# Patient Record
Sex: MALE | Race: WHITE | NOT HISPANIC OR LATINO | Employment: FULL TIME | URBAN - METROPOLITAN AREA
[De-identification: names, ages, dates, MRNs, and addresses within clinical notes are randomized per-mention and may not be internally consistent; named-entity substitution may affect disease eponyms.]

---

## 2019-05-21 ENCOUNTER — HISTORICAL (OUTPATIENT)
Dept: ADMINISTRATIVE | Facility: HOSPITAL | Age: 59
End: 2019-05-21

## 2019-12-13 ENCOUNTER — HISTORICAL (OUTPATIENT)
Dept: LAB | Facility: HOSPITAL | Age: 59
End: 2019-12-13

## 2019-12-23 ENCOUNTER — HISTORICAL (OUTPATIENT)
Dept: RADIOLOGY | Facility: HOSPITAL | Age: 59
End: 2019-12-23

## 2020-12-17 ENCOUNTER — HISTORICAL (OUTPATIENT)
Dept: ADMINISTRATIVE | Facility: HOSPITAL | Age: 60
End: 2020-12-17

## 2020-12-20 LAB — FINAL CULTURE: NO GROWTH

## 2021-09-02 ENCOUNTER — HISTORICAL (OUTPATIENT)
Dept: LAB | Facility: HOSPITAL | Age: 61
End: 2021-09-02

## 2021-09-02 LAB
ALBUMIN SERPL-MCNC: 4 GM/DL (ref 3.4–4.8)
ALP SERPL-CCNC: 86 UNIT/L (ref 40–150)
ALT SERPL-CCNC: 18 UNIT/L (ref 0–55)
AST SERPL-CCNC: 19 UNIT/L (ref 5–34)
BILIRUB SERPL-MCNC: 1.9 MG/DL
BILIRUBIN DIRECT+TOT PNL SERPL-MCNC: 0.6 MG/DL (ref 0–0.5)
BILIRUBIN DIRECT+TOT PNL SERPL-MCNC: 1.3 MG/DL (ref 0–0.8)
BUN SERPL-MCNC: 20.2 MG/DL (ref 8.4–25.7)
CALCIUM SERPL-MCNC: 9.1 MG/DL (ref 8.8–10)
CHLORIDE SERPL-SCNC: 102 MMOL/L (ref 98–107)
CHOLEST SERPL-MCNC: 207 MG/DL
CHOLEST/HDLC SERPL: 4 {RATIO} (ref 0–5)
CO2 SERPL-SCNC: 30 MMOL/L (ref 23–31)
CREAT SERPL-MCNC: 1.26 MG/DL (ref 0.73–1.18)
CREAT/UREA NIT SERPL: 16
ERYTHROCYTE [DISTWIDTH] IN BLOOD BY AUTOMATED COUNT: 11.6 % (ref 11.5–17)
FT4I SERPL CALC-MCNC: 2.48 (ref 2.6–3.6)
GLUCOSE SERPL-MCNC: 99 MG/DL (ref 82–115)
HAV IGM SERPL QL IA: NONREACTIVE
HBV CORE IGM SERPL QL IA: NONREACTIVE
HBV SURFACE AG SERPL QL IA: NONREACTIVE
HCT VFR BLD AUTO: 44.8 % (ref 42–52)
HCV AB SERPL QL IA: NONREACTIVE
HDLC SERPL-MCNC: 57 MG/DL (ref 35–60)
HEPATITIS PANEL INTERP: NORMAL
HGB BLD-MCNC: 15.7 GM/DL (ref 14–18)
LDLC SERPL CALC-MCNC: 125 MG/DL (ref 50–140)
MCH RBC QN AUTO: 32.8 PG (ref 27–31)
MCHC RBC AUTO-ENTMCNC: 35 GM/DL (ref 33–36)
MCV RBC AUTO: 93.7 FL (ref 80–94)
PLATELET # BLD AUTO: 182 X10(3)/MCL (ref 130–400)
PMV BLD AUTO: 8.5 FL (ref 9.4–12.4)
POTASSIUM SERPL-SCNC: 4.4 MMOL/L (ref 3.5–5.1)
PROT SERPL-MCNC: 6.4 GM/DL (ref 5.8–7.6)
PSA SERPL-MCNC: 0.77 NG/ML
RBC # BLD AUTO: 4.78 X10(6)/MCL (ref 4.7–6.1)
SODIUM SERPL-SCNC: 138 MMOL/L (ref 136–145)
T3RU NFR SERPL: 33.7 % (ref 31–39)
T4 SERPL-MCNC: 7.35 UG/DL (ref 4.87–11.72)
TRIGL SERPL-MCNC: 124 MG/DL (ref 34–140)
TSH SERPL-ACNC: 2.37 UIU/ML (ref 0.35–4.94)
VLDLC SERPL CALC-MCNC: 25 MG/DL
WBC # SPEC AUTO: 4.6 X10(3)/MCL (ref 4.5–11.5)

## 2022-04-10 ENCOUNTER — HISTORICAL (OUTPATIENT)
Dept: ADMINISTRATIVE | Facility: HOSPITAL | Age: 62
End: 2022-04-10

## 2022-04-29 VITALS
SYSTOLIC BLOOD PRESSURE: 128 MMHG | HEIGHT: 69 IN | WEIGHT: 168 LBS | WEIGHT: 168 LBS | BODY MASS INDEX: 24.88 KG/M2 | DIASTOLIC BLOOD PRESSURE: 84 MMHG | HEIGHT: 69 IN | BODY MASS INDEX: 24.88 KG/M2

## 2022-05-02 NOTE — HISTORICAL [facility]
This is a historical note converted from Mae. Formatting and pictures may have been removed.  Please reference Mae for original formatting and attached multimedia. Chief Complaint  c/o left knee pain and swelling, denies any injuries, previous sx 1975 and 1982 cartilage damage, xrays today...sm  History of Present Illness  Knee symptoms ::knee gives way ?? ?Knee joint pain on the left ?? ?Worse with weightbearing ?? ?Worse in the morning  ?? ?Slowly worsens with extended activity ?? ?Is increased by bending it ?? ?By twisting it ?? ?By kneeling ?? ?With stairs ?? ?By squatting  ?? ?Knee joint swelling on the left? ?posteriorly ?? ?Medially ?? ?Laterally? ?  ? ?Knee joint pain not improved by rest ?? ?Not by ice? ?? ?No clicking sensation in the left knee ?? ?No grating sensation in the left knee?  ?? ?The knee did not suddenly buckle due to contact ?? ?No popping sound was heard in the knee?  ?? ?No tingling ?? ?No burning sensation  Review of Systems  Review of Systems?  ?????Constitutional: ?No fever, No chills. ?  ?????Respiratory: ?No shortness of breath, No cough. ?  ?????Cardiovascular: ?No chest pain. ?  ?????Gastrointestinal: ?No nausea, No vomiting, No diarrhea, No constipation, No heartburn. ?  ?????Genitourinary: ?No dysuria, No hematuria. ?  ?????Hematology/Lymphatics: ?No bleeding tendency. ?  ?????Endocrine: ?No polyuria. ?  ?????Neurologic: ?Alert and oriented X4, No numbness, No tingling. ?  ?????Psychiatric: ?No anxiety, No depression. ?  ?????Integumentary: no abnormalities except as noted in history of present illness  Physical Exam  Vitals & Measurements  BP:?128/84?  HT:?175.26?cm? WT:?76.20?kg? BMI:?24.81?  PHYSICAL FINDINGS  Cardiovascular:  ? ?? Arterial Pulses: ? Dorsalis pedis pulses were normal right.? ? Dorsalis pedis pulses were normal left.  Musculoskeletal System:  ? ?? Hips:  ? ?? ?? ? General/bilateral: ? No swelling of the hips.? ? No induration of the hips.  ? ?? ?? ? Right  Hip: ? Motion was normal.? ? No pain was elicited by active internal rotation with the hip flexed.  ? ?? ?? ? ? No pain was elicited by active external rotation with the hip flexed.? ? No pain was elicited by active motion.? ? No pain was elicited by passive motion.  ? ?? ?? ? Left Hip: ? Motion was normal.  ? ?? Left Knee: ? Examined.? ? Positive effusion.? ?Localized swelling.? ?Genu varum.? ?Patella demonstrated crepitus.? ?Anterolateral aspect was tender on palpation.???Lateral?aspect was tender on palpation.  patella  Left Knee:  Knee Motion:? ?? ?? Value???????????  ? Active flexion??????120??? ?degrees  Active extension????10?? ?degrees  ?   left knee ?? Pain was elicited throughout the range of motion.? ? Swelling with a ?positive fluctuation test.? ? No erythema.? ? No warmth.? ? Anterior aspect was not tender on palpation.? ? Patellofemoral region was tender on palpation.? ? Medial collateral ligament was not tender on palpation.? ? Lateral collateral ligament was not tender on palpation.? ? No pain was elicited by motion using Houlton apprehension test.? ? No laxity of the posterior cruciate ligament.? ? No anterior drawer sign was present.? ? No one plane medial (straight) instability.? ? No one plane lateral (straight) instability.? ? A Lachman test did not demonstrate one plane anterior instability.? ? Clarkes sign was observed for chondromalacia.  ?   Right Knee:  Knee Motion Normal Range  ?   TESTS  Imaging:  X-Ray Knee:  AP and lateral view x-rays of the left knee with sunrise view of the patella were performed -of left knee.  ?   IMPRESSIONS RADIOLOGY TEST  Narrowing of the left knee joint space bone on bone laterally with staples , showed sclerosis of the left knee, and osteophytes arising from the left knee.  ?   aspirate and inject left knee  Administered corticosteroid injection?? ? 2cc cortisone and 2cc lidocaine using sterile technique after informed verbal consent. Risks discussed with  patient prior to injection. Informed the patient of the following:  -?Explained to patient that this injection in some patients will experience increased pain a few minutes after the injection and that the pain will last anywhere from 10 to 20 minutes. In order to decrease the pain you need to do the following:  ?Make sure to move the extremity in which the injection was given. If you do not move the medication sits there and can cause more pain.  Ice the affected joint every 2 hours for 20 minutes at a time for the next 24 hours.  ?If you are not already taking an anti-inflammatory take the following Ibuprofen/ Advil take 3 to 4 tablets every 6 to 8 hours or 2 Aleve every 12 hours for the next 5 days to help the medication work. If you cannot take anti-inflammatory take 2 extra strength Tylenol every 6 hours for the next 5 days.  Patient voiced understanding ?????????????????????????????????????????????????????????????????????????????  Assessment/Plan  1.?Primary osteoarthritis of left knee?M17.12  Referrals  Clinic Follow up, *Est. 06/21/19 3:00:00 CDT, Order for future visit, Primary osteoarthritis of left knee, [facility]   Problem List/Past Medical History  Ongoing  Primary osteoarthritis of left knee  Rupture of right proximal biceps tendon  Historical  No qualifying data  Procedure/Surgical History  LEFT KNEE SURGERY RECONSTRUCTION  LEFT SHOULDER ARTHROSCOPY  RIGHT KNEE ARTHROSCOPY   Medications  meloxicam 7.5 mg oral tablet, 7.5 mg= 1 tab(s), Oral, Daily  OXYCOD/APAP TAB 10-325MG, 1 tab(s), Oral, q4hr,? ?Not taking: Last Dose Date/Time Unknown. DONE  traMADol 50 mg oral tablet, 50 mg= 1 tab(s), Oral, BID  Allergies  No Known Medication Allergies  Social History  Alcohol  Never, 06/08/2017  Employment/School  Employed, 06/08/2017  Home/Environment  Lives with Spouse. Living situation: Home/Independent., 06/08/2017  Substance Abuse  Never, 06/08/2017  Tobacco  Never (less than 100 in lifetime), N/A,  05/21/2019  Health Maintenance  Health Maintenance  ???Pending?(in the next year)  ??? ??OverDue  ??? ? ? ?Alcohol Misuse Screening due??01/01/19??and every 1??year(s)  ??? ??Due?  ??? ? ? ?ADL Screening due??06/06/19??and every 1??year(s)  ??? ? ? ?Aspirin Therapy for CVD Prevention due??06/06/19??and every 1??year(s)  ??? ? ? ?Colorectal Screening due??06/06/19??and every?  ??? ? ? ?Depression Screening due??06/06/19??and every?  ??? ? ? ?Diabetes Screening due??06/06/19??and every?  ??? ? ? ?Lipid Screening due??06/06/19??and every?  ??? ? ? ?Tetanus Vaccine due??06/06/19??and every 10??year(s)  ??? ? ? ?Zoster Vaccine due??06/06/19??and every 100??year(s)  ??? ??Due In Future?  ??? ? ? ?Obesity Screening not due until??01/01/20??and every 1??year(s)  ??? ? ? ?Blood Pressure Screening not due until??05/20/20??and every 1??year(s)  ??? ? ? ?Body Mass Index Check not due until??05/20/20??and every 1??year(s)  ???Satisfied?(in the past 1 year)  ??? ??Satisfied?  ??? ? ? ?Blood Pressure Screening on??05/21/19.??Satisfied by Deonna Scott LPN  ??? ? ? ?Body Mass Index Check on??05/21/19.??Satisfied by Deonna Scott LPN  ??? ? ? ?Obesity Screening on??05/21/19.??Satisfied by Deonna Scott LPN  ?  ?

## 2022-10-21 ENCOUNTER — LAB VISIT (OUTPATIENT)
Dept: LAB | Facility: HOSPITAL | Age: 62
End: 2022-10-21
Attending: FAMILY MEDICINE
Payer: COMMERCIAL

## 2022-10-21 DIAGNOSIS — Z01.818 PREOP TESTING: Primary | ICD-10-CM

## 2022-10-21 LAB
ALBUMIN SERPL-MCNC: 3.8 GM/DL (ref 3.4–4.8)
ALBUMIN/GLOB SERPL: 1.4 RATIO (ref 1.1–2)
ALP SERPL-CCNC: 74 UNIT/L (ref 40–150)
ALT SERPL-CCNC: 21 UNIT/L (ref 0–55)
APTT PPP: 26.4 SECONDS (ref 23.2–33.7)
AST SERPL-CCNC: 23 UNIT/L (ref 5–34)
BASOPHILS # BLD AUTO: 0.02 X10(3)/MCL (ref 0–0.2)
BASOPHILS NFR BLD AUTO: 0.4 %
BILIRUBIN DIRECT+TOT PNL SERPL-MCNC: 1 MG/DL
BUN SERPL-MCNC: 25.5 MG/DL (ref 8.4–25.7)
CALCIUM SERPL-MCNC: 9.4 MG/DL (ref 8.8–10)
CHLORIDE SERPL-SCNC: 103 MMOL/L (ref 98–107)
CO2 SERPL-SCNC: 31 MMOL/L (ref 23–31)
CREAT SERPL-MCNC: 1.15 MG/DL (ref 0.73–1.18)
EOSINOPHIL # BLD AUTO: 0.11 X10(3)/MCL (ref 0–0.9)
EOSINOPHIL NFR BLD AUTO: 2 %
ERYTHROCYTE [DISTWIDTH] IN BLOOD BY AUTOMATED COUNT: 11.8 % (ref 11.5–17)
GFR SERPLBLD CREATININE-BSD FMLA CKD-EPI: >60 MLS/MIN/1.73/M2
GLOBULIN SER-MCNC: 2.8 GM/DL (ref 2.4–3.5)
GLUCOSE SERPL-MCNC: 103 MG/DL (ref 82–115)
HCT VFR BLD AUTO: 43.2 % (ref 42–52)
HGB BLD-MCNC: 14.3 GM/DL (ref 14–18)
IMM GRANULOCYTES # BLD AUTO: 0 X10(3)/MCL (ref 0–0.04)
IMM GRANULOCYTES NFR BLD AUTO: 0 %
INR BLD: 1.05 (ref 0–1.3)
LYMPHOCYTES # BLD AUTO: 1.45 X10(3)/MCL (ref 0.6–4.6)
LYMPHOCYTES NFR BLD AUTO: 26.3 %
MCH RBC QN AUTO: 31.4 PG (ref 27–31)
MCHC RBC AUTO-ENTMCNC: 33.1 MG/DL (ref 33–36)
MCV RBC AUTO: 94.9 FL (ref 80–94)
MONOCYTES # BLD AUTO: 0.38 X10(3)/MCL (ref 0.1–1.3)
MONOCYTES NFR BLD AUTO: 6.9 %
NEUTROPHILS # BLD AUTO: 3.6 X10(3)/MCL (ref 2.1–9.2)
NEUTROPHILS NFR BLD AUTO: 64.4 %
PLATELET # BLD AUTO: 216 X10(3)/MCL (ref 130–400)
PMV BLD AUTO: 8.5 FL (ref 7.4–10.4)
POTASSIUM SERPL-SCNC: 4.3 MMOL/L (ref 3.5–5.1)
PROT SERPL-MCNC: 6.6 GM/DL (ref 5.8–7.6)
PROTHROMBIN TIME: 10.6 SECONDS (ref 12.5–14.5)
RBC # BLD AUTO: 4.55 X10(6)/MCL (ref 4.7–6.1)
SODIUM SERPL-SCNC: 140 MMOL/L (ref 136–145)
WBC # SPEC AUTO: 5.5 X10(3)/MCL (ref 4.5–11.5)

## 2022-10-21 PROCEDURE — 85730 THROMBOPLASTIN TIME PARTIAL: CPT

## 2022-10-21 PROCEDURE — 85610 PROTHROMBIN TIME: CPT

## 2022-10-21 PROCEDURE — 85025 COMPLETE CBC W/AUTO DIFF WBC: CPT

## 2022-10-21 PROCEDURE — 80053 COMPREHEN METABOLIC PANEL: CPT

## 2022-10-21 PROCEDURE — 36415 COLL VENOUS BLD VENIPUNCTURE: CPT

## 2023-11-30 DIAGNOSIS — M25.562 LEFT KNEE PAIN, UNSPECIFIED CHRONICITY: Primary | ICD-10-CM

## 2023-12-05 ENCOUNTER — HOSPITAL ENCOUNTER (OUTPATIENT)
Dept: RADIOLOGY | Facility: CLINIC | Age: 63
Discharge: HOME | End: 2023-12-05
Attending: ORTHOPAEDIC SURGERY
Payer: MEDICARE

## 2023-12-05 ENCOUNTER — OFFICE VISIT (OUTPATIENT)
Dept: ORTHOPEDICS | Facility: CLINIC | Age: 63
End: 2023-12-05
Payer: MEDICARE

## 2023-12-05 VITALS
WEIGHT: 176 LBS | HEIGHT: 69 IN | BODY MASS INDEX: 26.07 KG/M2 | HEART RATE: 61 BPM | SYSTOLIC BLOOD PRESSURE: 141 MMHG | DIASTOLIC BLOOD PRESSURE: 76 MMHG

## 2023-12-05 DIAGNOSIS — M17.12 PRIMARY OSTEOARTHRITIS OF LEFT KNEE: ICD-10-CM

## 2023-12-05 DIAGNOSIS — M25.562 CHRONIC PAIN OF LEFT KNEE: ICD-10-CM

## 2023-12-05 DIAGNOSIS — G89.29 CHRONIC PAIN OF LEFT KNEE: Primary | ICD-10-CM

## 2023-12-05 DIAGNOSIS — G89.29 CHRONIC PAIN OF LEFT KNEE: ICD-10-CM

## 2023-12-05 DIAGNOSIS — M25.562 CHRONIC PAIN OF LEFT KNEE: Primary | ICD-10-CM

## 2023-12-05 PROCEDURE — 99204 OFFICE O/P NEW MOD 45 MIN: CPT | Mod: 25,,, | Performed by: ORTHOPAEDIC SURGERY

## 2023-12-05 PROCEDURE — 1101F PT FALLS ASSESS-DOCD LE1/YR: CPT | Mod: CPTII,,, | Performed by: ORTHOPAEDIC SURGERY

## 2023-12-05 PROCEDURE — 4010F PR ACE/ARB THEARPY RXD/TAKEN: ICD-10-PCS | Mod: CPTII,,, | Performed by: ORTHOPAEDIC SURGERY

## 2023-12-05 PROCEDURE — 3008F BODY MASS INDEX DOCD: CPT | Mod: CPTII,,, | Performed by: ORTHOPAEDIC SURGERY

## 2023-12-05 PROCEDURE — 3008F PR BODY MASS INDEX (BMI) DOCUMENTED: ICD-10-PCS | Mod: CPTII,,, | Performed by: ORTHOPAEDIC SURGERY

## 2023-12-05 PROCEDURE — 3077F SYST BP >= 140 MM HG: CPT | Mod: CPTII,,, | Performed by: ORTHOPAEDIC SURGERY

## 2023-12-05 PROCEDURE — 1101F PR PT FALLS ASSESS DOC 0-1 FALLS W/OUT INJ PAST YR: ICD-10-PCS | Mod: CPTII,,, | Performed by: ORTHOPAEDIC SURGERY

## 2023-12-05 PROCEDURE — 20610 DRAIN/INJ JOINT/BURSA W/O US: CPT | Mod: LT,,, | Performed by: ORTHOPAEDIC SURGERY

## 2023-12-05 PROCEDURE — 4010F ACE/ARB THERAPY RXD/TAKEN: CPT | Mod: CPTII,,, | Performed by: ORTHOPAEDIC SURGERY

## 2023-12-05 PROCEDURE — 3077F PR MOST RECENT SYSTOLIC BLOOD PRESSURE >= 140 MM HG: ICD-10-PCS | Mod: CPTII,,, | Performed by: ORTHOPAEDIC SURGERY

## 2023-12-05 PROCEDURE — 3288F FALL RISK ASSESSMENT DOCD: CPT | Mod: CPTII,,, | Performed by: ORTHOPAEDIC SURGERY

## 2023-12-05 PROCEDURE — 3078F PR MOST RECENT DIASTOLIC BLOOD PRESSURE < 80 MM HG: ICD-10-PCS | Mod: CPTII,,, | Performed by: ORTHOPAEDIC SURGERY

## 2023-12-05 PROCEDURE — 3078F DIAST BP <80 MM HG: CPT | Mod: CPTII,,, | Performed by: ORTHOPAEDIC SURGERY

## 2023-12-05 PROCEDURE — 73564 XR KNEE COMP 4 OR MORE VIEWS LEFT: ICD-10-PCS | Mod: LT,,, | Performed by: ORTHOPAEDIC SURGERY

## 2023-12-05 PROCEDURE — 99204 PR OFFICE/OUTPT VISIT, NEW, LEVL IV, 45-59 MIN: ICD-10-PCS | Mod: 25,,, | Performed by: ORTHOPAEDIC SURGERY

## 2023-12-05 PROCEDURE — 73564 X-RAY EXAM KNEE 4 OR MORE: CPT | Mod: LT,,, | Performed by: ORTHOPAEDIC SURGERY

## 2023-12-05 PROCEDURE — 1159F PR MEDICATION LIST DOCUMENTED IN MEDICAL RECORD: ICD-10-PCS | Mod: CPTII,,, | Performed by: ORTHOPAEDIC SURGERY

## 2023-12-05 PROCEDURE — 20610 LARGE JOINT ASPIRATION/INJECTION: L KNEE: ICD-10-PCS | Mod: LT,,, | Performed by: ORTHOPAEDIC SURGERY

## 2023-12-05 PROCEDURE — 1159F MED LIST DOCD IN RCRD: CPT | Mod: CPTII,,, | Performed by: ORTHOPAEDIC SURGERY

## 2023-12-05 PROCEDURE — 3288F PR FALLS RISK ASSESSMENT DOCUMENTED: ICD-10-PCS | Mod: CPTII,,, | Performed by: ORTHOPAEDIC SURGERY

## 2023-12-05 RX ORDER — METHYLPREDNISOLONE ACETATE 80 MG/ML
80 INJECTION, SUSPENSION INTRA-ARTICULAR; INTRALESIONAL; INTRAMUSCULAR; SOFT TISSUE
Status: DISCONTINUED | OUTPATIENT
Start: 2023-12-05 | End: 2023-12-05 | Stop reason: HOSPADM

## 2023-12-05 RX ORDER — LIDOCAINE HYDROCHLORIDE 20 MG/ML
5 INJECTION, SOLUTION EPIDURAL; INFILTRATION; INTRACAUDAL; PERINEURAL
Status: DISCONTINUED | OUTPATIENT
Start: 2023-12-05 | End: 2023-12-05 | Stop reason: HOSPADM

## 2023-12-05 RX ORDER — EZETIMIBE 10 MG/1
10 TABLET ORAL
COMMUNITY
Start: 2023-09-21

## 2023-12-05 RX ORDER — OXYCODONE AND ACETAMINOPHEN 10; 325 MG/1; MG/1
TABLET ORAL
COMMUNITY
Start: 2023-12-04

## 2023-12-05 RX ADMIN — LIDOCAINE HYDROCHLORIDE 5 ML: 20 INJECTION, SOLUTION EPIDURAL; INFILTRATION; INTRACAUDAL; PERINEURAL at 08:12

## 2023-12-05 RX ADMIN — METHYLPREDNISOLONE ACETATE 80 MG: 80 INJECTION, SUSPENSION INTRA-ARTICULAR; INTRALESIONAL; INTRAMUSCULAR; SOFT TISSUE at 08:12

## 2023-12-05 NOTE — PROGRESS NOTES
Chief Complaint:   Chief Complaint   Patient presents with    Left Knee - Pain     Pt has hx of sx over 10 years ago. Pt states that the pain has gotten worse throughout these years. Pt states he has no cartilage left. Pt had several aspirations in the past, and doesn't recall any injections.        History of present illness:  63-year-old male presents today for evaluation left knee pain.  Patient has a longstanding of pain to left knee.  He is tried anti-inflammatories.  He is tried activity modification.  Patient has a history of surgeon left knee years ago for a cartilage issue he was high school/college.  Patient has popping catching in the left knee.  Worse with activity.  Improved by rest.    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 OR;  Service: ENT;  Laterality: Right;    ARTHROSCOPY OF KNEE Bilateral     COLONOSCOPY      SHOULDER SURGERY Left     TRIGGER FINGER RELEASE Bilateral     x2 right hand, x2 left hand    TYMPANOPLASTY Right 12/22/2022    Procedure: RIGHT POST AURICULAR CARTILAGE TYMPANOPLASTY- NDM;  Surgeon: Jesu Bautista Jr., MD;  Location: [facility] OR;  Service: ENT;  Laterality: Right;       Current Outpatient Medications   Medication Sig    ezetimibe (ZETIA) 10 mg tablet Take 10 mg by mouth.    lisinopriL 10 MG tablet Take 10 mg by mouth.    oxyCODONE-acetaminophen (PERCOCET)  mg per tablet Take by mouth.    pravastatin (PRAVACHOL) 20 MG tablet Take 20 mg by mouth.    traMADoL (ULTRAM) 50 mg tablet      No current facility-administered medications for this visit.     Facility-Administered  Medications Ordered in Other Visits   Medication    lactated ringers infusion       Review of patient's allergies indicates:   Allergen Reactions    Dairy-aid        Family History   Family history unknown: Yes       Social History     Socioeconomic History    Marital status:    Tobacco Use    Smoking status: Never    Smokeless tobacco: Never   Substance and Sexual Activity    Alcohol use: Yes     Alcohol/week: 1.0 standard drink of alcohol     Types: 1 Shots of liquor per week     Comment: Very occasionally    Drug use: Never    Sexual activity: Yes     Partners: Female           Review of Systems:    Constitution: Negative for chills, fever, and sweats.  Negative for unexplained weight loss.    HENT:  Negative for headaches and blurry vision.    Cardiovascular:Negative for chest pain or irregular heart beat. Negative for hypertension.    Respiratory:  Negative for cough and shortness of breath.    Gastrointestinal: Negative for abdominal pain, heartburn, melena, nausea, and vomitting.    Genitourinary:  Negative bladder incontinence and dysuria.    Musculoskeletal:  See HPI    Neurological: Negative for numbness.    Psychiatric/Behavioral: Negative for depression.  The patient is not nervous/anxious.      Endocrine: Negative for polyuria    Hematologic/Lymphatic: Negative for bleeding problem.  Does not bruise/bleed easily.    Skin: Negative for poor would healing and rash      Physical Examination:    Vital Signs:    Vitals:    12/05/23 0811   BP: (!) 141/76   Pulse: 61       Body mass index is 25.99 kg/m².    General: No acute distress, alert and oriented, healthy appearing    HEENT: Head is atraumatic, mucous membranes are moist    Neck: Supples, no JVD    Cardiovascular: Palpable dorsalis pedis and posterior tibial pulses, regular rate and rhythm to those pulses    Lungs: Breathing non-labored    Skin: no rashes appreciated    Neurologic: Can flex and extend knees, ankles, and toes. Sensation is grossly  intact    Left knee:  Patient has significant crepitus of motion left knee.  It was valgus alignment.  Patient gets extension of 10.  Flexion 105.     X-rays:  Four views left knee reviewed.  Patient end-stage osteoarthritis.  It is complete loss of joint space and bone-on-bone articulation     Assessment::  Left knee osteoarthritis    Plan:  Discussed all treatment with the patient.  He has end-stage osteoarthritis of the left knee.  Long-term treatment standpoint, patient would be a good candidate for knee arthroplasty.  He would like to begin with conservative management given his lack of severe symptoms during the day.  We will give him an injection today to see if this can calm this down and see how well it works.    This note was created using [employer] voice recognition software that occasionally misinterpreted phrases or words.    Consult note is delivered via Epic messaging service.

## 2023-12-05 NOTE — PROCEDURES
Large Joint Aspiration/Injection: L knee    Date/Time: 12/5/2023 8:15 AM    Performed by: Franklyn Ornelas MD  Authorized by: Franklyn Ornelas MD    Consent Done?:  Yes (Verbal)  Indications:  Arthritis and pain  Timeout: prior to procedure the correct patient, procedure, and site was verified    Prep: patient was prepped and draped in usual sterile fashion      Details:  Needle Size:  22 G  Approach:  Anterolateral  Location:  Knee  Site:  L knee  Medications:  5 mL LIDOcaine (PF) 20 mg/mL (2%) 20 mg/mL (2 %); 80 mg methylPREDNISolone acetate 80 mg/mL

## 2024-03-15 ENCOUNTER — LAB VISIT (OUTPATIENT)
Dept: LAB | Facility: HOSPITAL | Age: 64
End: 2024-03-15
Attending: FAMILY MEDICINE
Payer: MEDICARE

## 2024-03-15 DIAGNOSIS — K62.89 CHRONIC IDIOPATHIC ANAL PAIN: ICD-10-CM

## 2024-03-15 DIAGNOSIS — M19.90 SENILE ARTHRITIS: Primary | ICD-10-CM

## 2024-03-15 DIAGNOSIS — E78.5 HYPERLIPIDEMIA, UNSPECIFIED HYPERLIPIDEMIA TYPE: ICD-10-CM

## 2024-03-15 DIAGNOSIS — Z00.00 ROUTINE GENERAL MEDICAL EXAMINATION AT A HEALTH CARE FACILITY: ICD-10-CM

## 2024-03-15 DIAGNOSIS — Z12.5 SPECIAL SCREENING FOR MALIGNANT NEOPLASM OF PROSTATE: ICD-10-CM

## 2024-03-15 DIAGNOSIS — G89.29 CHRONIC IDIOPATHIC ANAL PAIN: ICD-10-CM

## 2024-03-15 LAB
ALBUMIN SERPL-MCNC: 3.5 G/DL (ref 3.4–4.8)
ALP SERPL-CCNC: 77 UNIT/L (ref 40–150)
ALT SERPL-CCNC: 24 UNIT/L (ref 0–55)
ANION GAP SERPL CALC-SCNC: 6 MEQ/L
AST SERPL-CCNC: 21 UNIT/L (ref 5–34)
BASOPHILS # BLD AUTO: 0.01 X10(3)/MCL
BASOPHILS NFR BLD AUTO: 0.2 %
BILIRUB SERPL-MCNC: 1.1 MG/DL
BILIRUBIN DIRECT+TOT PNL SERPL-MCNC: 0.4 MG/DL (ref 0–?)
BILIRUBIN DIRECT+TOT PNL SERPL-MCNC: 0.7 MG/DL (ref 0–0.8)
BUN SERPL-MCNC: 27.1 MG/DL (ref 8.4–25.7)
CALCIUM SERPL-MCNC: 9.5 MG/DL (ref 8.8–10)
CHLORIDE SERPL-SCNC: 108 MMOL/L (ref 98–107)
CHOLEST SERPL-MCNC: 173 MG/DL
CHOLEST/HDLC SERPL: 3 {RATIO} (ref 0–5)
CK SERPL-CCNC: 132 U/L (ref 30–200)
CO2 SERPL-SCNC: 28 MMOL/L (ref 23–31)
CREAT SERPL-MCNC: 1.35 MG/DL (ref 0.73–1.18)
CREAT/UREA NIT SERPL: 20
EOSINOPHIL # BLD AUTO: 0.08 X10(3)/MCL (ref 0–0.9)
EOSINOPHIL NFR BLD AUTO: 1.5 %
ERYTHROCYTE [DISTWIDTH] IN BLOOD BY AUTOMATED COUNT: 11.5 % (ref 11.5–17)
FT4I SERPL CALC-MCNC: 2.25 (ref 2.6–3.6)
GFR SERPLBLD CREATININE-BSD FMLA CKD-EPI: 58 MLS/MIN/1.73/M2
GLUCOSE SERPL-MCNC: 100 MG/DL (ref 82–115)
HCT VFR BLD AUTO: 42.7 % (ref 42–52)
HDLC SERPL-MCNC: 51 MG/DL (ref 35–60)
HGB BLD-MCNC: 14.3 G/DL (ref 14–18)
IMM GRANULOCYTES # BLD AUTO: 0 X10(3)/MCL (ref 0–0.04)
IMM GRANULOCYTES NFR BLD AUTO: 0 %
LDLC SERPL CALC-MCNC: 109 MG/DL (ref 50–140)
LYMPHOCYTES # BLD AUTO: 1.36 X10(3)/MCL (ref 0.6–4.6)
LYMPHOCYTES NFR BLD AUTO: 26 %
MAGNESIUM SERPL-MCNC: 1.9 MG/DL (ref 1.6–2.6)
MCH RBC QN AUTO: 32.6 PG (ref 27–31)
MCHC RBC AUTO-ENTMCNC: 33.5 G/DL (ref 33–36)
MCV RBC AUTO: 97.5 FL (ref 80–94)
MONOCYTES # BLD AUTO: 0.4 X10(3)/MCL (ref 0.1–1.3)
MONOCYTES NFR BLD AUTO: 7.6 %
NEUTROPHILS # BLD AUTO: 3.39 X10(3)/MCL (ref 2.1–9.2)
NEUTROPHILS NFR BLD AUTO: 64.7 %
PLATELET # BLD AUTO: 192 X10(3)/MCL (ref 130–400)
PMV BLD AUTO: 9.4 FL (ref 7.4–10.4)
POTASSIUM SERPL-SCNC: 5 MMOL/L (ref 3.5–5.1)
PROT SERPL-MCNC: 6 GM/DL (ref 5.8–7.6)
PSA SERPL-MCNC: 0.64 NG/ML
RBC # BLD AUTO: 4.38 X10(6)/MCL (ref 4.7–6.1)
SODIUM SERPL-SCNC: 142 MMOL/L (ref 136–145)
T3RU NFR SERPL: 33.75 % (ref 31–39)
T4 SERPL-MCNC: 6.67 UG/DL (ref 4.87–11.72)
TRIGL SERPL-MCNC: 63 MG/DL (ref 34–140)
TSH SERPL-ACNC: 1.16 UIU/ML (ref 0.35–4.94)
VLDLC SERPL CALC-MCNC: 13 MG/DL
WBC # SPEC AUTO: 5.24 X10(3)/MCL (ref 4.5–11.5)

## 2024-03-15 PROCEDURE — 84443 ASSAY THYROID STIM HORMONE: CPT

## 2024-03-15 PROCEDURE — 80048 BASIC METABOLIC PNL TOTAL CA: CPT

## 2024-03-15 PROCEDURE — 83735 ASSAY OF MAGNESIUM: CPT

## 2024-03-15 PROCEDURE — 82550 ASSAY OF CK (CPK): CPT

## 2024-03-15 PROCEDURE — 85025 COMPLETE CBC W/AUTO DIFF WBC: CPT

## 2024-03-15 PROCEDURE — 84436 ASSAY OF TOTAL THYROXINE: CPT

## 2024-03-15 PROCEDURE — 80076 HEPATIC FUNCTION PANEL: CPT

## 2024-03-15 PROCEDURE — 80061 LIPID PANEL: CPT

## 2024-03-15 PROCEDURE — 36415 COLL VENOUS BLD VENIPUNCTURE: CPT

## 2024-03-15 PROCEDURE — 84153 ASSAY OF PSA TOTAL: CPT

## 2024-06-28 ENCOUNTER — LAB VISIT (OUTPATIENT)
Dept: LAB | Facility: HOSPITAL | Age: 64
End: 2024-06-28
Attending: NURSE PRACTITIONER
Payer: MEDICARE

## 2024-06-28 DIAGNOSIS — I10 ESSENTIAL HYPERTENSION, MALIGNANT: Primary | ICD-10-CM

## 2024-06-28 LAB
ALBUMIN SERPL-MCNC: 3.7 G/DL (ref 3.4–4.8)
ALP SERPL-CCNC: 76 UNIT/L (ref 40–150)
ALT SERPL-CCNC: 21 UNIT/L (ref 0–55)
AST SERPL-CCNC: 19 UNIT/L (ref 5–34)
BILIRUB DIRECT SERPL-MCNC: 0.3 MG/DL (ref 0–?)
BILIRUB SERPL-MCNC: 0.9 MG/DL
BILIRUBIN DIRECT+TOT PNL SERPL-MCNC: 0.6 MG/DL (ref 0–0.8)
CHOLEST SERPL-MCNC: 161 MG/DL
CHOLEST/HDLC SERPL: 3 {RATIO} (ref 0–5)
HDLC SERPL-MCNC: 50 MG/DL (ref 35–60)
LDLC SERPL CALC-MCNC: 90 MG/DL (ref 50–140)
PROT SERPL-MCNC: 5.9 GM/DL (ref 5.8–7.6)
TRIGL SERPL-MCNC: 103 MG/DL (ref 34–140)
VLDLC SERPL CALC-MCNC: 21 MG/DL

## 2024-06-28 PROCEDURE — 80061 LIPID PANEL: CPT

## 2024-06-28 PROCEDURE — 80076 HEPATIC FUNCTION PANEL: CPT

## 2024-06-28 PROCEDURE — 36415 COLL VENOUS BLD VENIPUNCTURE: CPT

## 2025-02-09 ENCOUNTER — HOSPITAL ENCOUNTER (EMERGENCY)
Facility: HOSPITAL | Age: 65
Discharge: HOME | End: 2025-02-09
Attending: INTERNAL MEDICINE
Payer: MEDICARE

## 2025-02-09 VITALS
WEIGHT: 165 LBS | BODY MASS INDEX: 24.44 KG/M2 | TEMPERATURE: 101 F | HEART RATE: 72 BPM | OXYGEN SATURATION: 97 % | RESPIRATION RATE: 20 BRPM | DIASTOLIC BLOOD PRESSURE: 62 MMHG | SYSTOLIC BLOOD PRESSURE: 118 MMHG | HEIGHT: 69 IN

## 2025-02-09 DIAGNOSIS — R50.9 FEVER: ICD-10-CM

## 2025-02-09 LAB
ALBUMIN SERPL-MCNC: 4 G/DL (ref 3.4–4.8)
ALBUMIN/GLOB SERPL: 1.4 RATIO (ref 1.1–2)
ALP SERPL-CCNC: 72 UNIT/L (ref 40–150)
ALT SERPL-CCNC: 20 UNIT/L (ref 0–55)
ANION GAP SERPL CALC-SCNC: 8 MEQ/L
AST SERPL-CCNC: 21 UNIT/L (ref 5–34)
BASOPHILS # BLD AUTO: 0.01 X10(3)/MCL
BASOPHILS NFR BLD AUTO: 0.2 %
BILIRUB SERPL-MCNC: 1.1 MG/DL
BILIRUB UR QL STRIP.AUTO: NEGATIVE
BUN SERPL-MCNC: 31 MG/DL (ref 8.4–25.7)
CALCIUM SERPL-MCNC: 8.8 MG/DL (ref 8.8–10)
CHLORIDE SERPL-SCNC: 105 MMOL/L (ref 98–107)
CLARITY UR: CLEAR
CO2 SERPL-SCNC: 26 MMOL/L (ref 23–31)
COLOR UR AUTO: YELLOW
CREAT SERPL-MCNC: 1.78 MG/DL (ref 0.72–1.25)
CREAT/UREA NIT SERPL: 17
EOSINOPHIL # BLD AUTO: 0.04 X10(3)/MCL (ref 0–0.9)
EOSINOPHIL NFR BLD AUTO: 0.9 %
ERYTHROCYTE [DISTWIDTH] IN BLOOD BY AUTOMATED COUNT: 11.9 % (ref 11.5–17)
FLUAV AG UPPER RESP QL IA.RAPID: NOT DETECTED
FLUBV AG UPPER RESP QL IA.RAPID: NOT DETECTED
GFR SERPLBLD CREATININE-BSD FMLA CKD-EPI: 42 ML/MIN/1.73/M2
GLOBULIN SER-MCNC: 2.9 GM/DL (ref 2.4–3.5)
GLUCOSE SERPL-MCNC: 80 MG/DL (ref 82–115)
GLUCOSE UR QL STRIP: NEGATIVE
HCT VFR BLD AUTO: 40.7 % (ref 42–52)
HGB BLD-MCNC: 14.1 G/DL (ref 14–18)
HGB UR QL STRIP: NEGATIVE
IMM GRANULOCYTES # BLD AUTO: 0.02 X10(3)/MCL (ref 0–0.04)
IMM GRANULOCYTES NFR BLD AUTO: 0.4 %
KETONES UR QL STRIP: NEGATIVE
LACTATE SERPL-SCNC: 1.5 MMOL/L (ref 0.5–2.2)
LEUKOCYTE ESTERASE UR QL STRIP: NEGATIVE
LYMPHOCYTES # BLD AUTO: 0.36 X10(3)/MCL (ref 0.6–4.6)
LYMPHOCYTES NFR BLD AUTO: 7.8 %
MCH RBC QN AUTO: 32.1 PG (ref 27–31)
MCHC RBC AUTO-ENTMCNC: 34.6 G/DL (ref 33–36)
MCV RBC AUTO: 92.7 FL (ref 80–94)
MONOCYTES # BLD AUTO: 0.07 X10(3)/MCL (ref 0.1–1.3)
MONOCYTES NFR BLD AUTO: 1.5 %
NEUTROPHILS # BLD AUTO: 4.12 X10(3)/MCL (ref 2.1–9.2)
NEUTROPHILS NFR BLD AUTO: 89.2 %
NITRITE UR QL STRIP: NEGATIVE
NRBC BLD AUTO-RTO: 0 %
PH UR STRIP: 6.5 [PH]
PLATELET # BLD AUTO: 157 X10(3)/MCL (ref 130–400)
PMV BLD AUTO: 8.9 FL (ref 7.4–10.4)
POTASSIUM SERPL-SCNC: 5 MMOL/L (ref 3.5–5.1)
PROT SERPL-MCNC: 6.9 GM/DL (ref 5.8–7.6)
PROT UR QL STRIP: NEGATIVE
RBC # BLD AUTO: 4.39 X10(6)/MCL (ref 4.7–6.1)
RSV A 5' UTR RNA NPH QL NAA+PROBE: NOT DETECTED
SARS-COV-2 RNA RESP QL NAA+PROBE: NOT DETECTED
SODIUM SERPL-SCNC: 139 MMOL/L (ref 136–145)
SP GR UR STRIP.AUTO: 1.02 (ref 1–1.03)
UROBILINOGEN UR STRIP-ACNC: 0.2
WBC # BLD AUTO: 4.62 X10(3)/MCL (ref 4.5–11.5)

## 2025-02-09 PROCEDURE — 25000003 PHARM REV CODE 250: Performed by: INTERNAL MEDICINE

## 2025-02-09 PROCEDURE — 80053 COMPREHEN METABOLIC PANEL: CPT | Performed by: INTERNAL MEDICINE

## 2025-02-09 PROCEDURE — 63600175 PHARM REV CODE 636 W HCPCS: Performed by: INTERNAL MEDICINE

## 2025-02-09 PROCEDURE — 85025 COMPLETE CBC W/AUTO DIFF WBC: CPT | Performed by: INTERNAL MEDICINE

## 2025-02-09 PROCEDURE — 0241U COVID/RSV/FLU A&B PCR: CPT | Performed by: INTERNAL MEDICINE

## 2025-02-09 PROCEDURE — 99285 EMERGENCY DEPT VISIT HI MDM: CPT | Mod: 25

## 2025-02-09 PROCEDURE — 87040 BLOOD CULTURE FOR BACTERIA: CPT | Performed by: INTERNAL MEDICINE

## 2025-02-09 PROCEDURE — 83605 ASSAY OF LACTIC ACID: CPT | Performed by: INTERNAL MEDICINE

## 2025-02-09 PROCEDURE — 96360 HYDRATION IV INFUSION INIT: CPT

## 2025-02-09 PROCEDURE — 81003 URINALYSIS AUTO W/O SCOPE: CPT | Performed by: INTERNAL MEDICINE

## 2025-02-09 RX ORDER — IBUPROFEN 600 MG/1
600 TABLET ORAL EVERY 6 HOURS PRN
Qty: 20 TABLET | Refills: 0 | Status: SHIPPED | OUTPATIENT
Start: 2025-02-09 | End: 2025-02-09

## 2025-02-09 RX ORDER — ACETAMINOPHEN 500 MG
1000 TABLET ORAL
Status: COMPLETED | OUTPATIENT
Start: 2025-02-09 | End: 2025-02-09

## 2025-02-09 RX ORDER — OSELTAMIVIR PHOSPHATE 75 MG/1
75 CAPSULE ORAL 2 TIMES DAILY
Qty: 10 CAPSULE | Refills: 0 | Status: SHIPPED | OUTPATIENT
Start: 2025-02-09 | End: 2025-02-09

## 2025-02-09 RX ORDER — IBUPROFEN 600 MG/1
600 TABLET ORAL
Status: COMPLETED | OUTPATIENT
Start: 2025-02-09 | End: 2025-02-09

## 2025-02-09 RX ORDER — IBUPROFEN 600 MG/1
600 TABLET ORAL EVERY 6 HOURS PRN
Qty: 20 TABLET | Refills: 0 | Status: SHIPPED | OUTPATIENT
Start: 2025-02-09

## 2025-02-09 RX ORDER — OSELTAMIVIR PHOSPHATE 75 MG/1
75 CAPSULE ORAL 2 TIMES DAILY
Qty: 10 CAPSULE | Refills: 0 | Status: SHIPPED | OUTPATIENT
Start: 2025-02-09 | End: 2025-02-14

## 2025-02-09 RX ADMIN — ACETAMINOPHEN 1000 MG: 500 TABLET, FILM COATED ORAL at 08:02

## 2025-02-09 RX ADMIN — SODIUM CHLORIDE, POTASSIUM CHLORIDE, SODIUM LACTATE AND CALCIUM CHLORIDE 1000 ML: 600; 310; 30; 20 INJECTION, SOLUTION INTRAVENOUS at 07:02

## 2025-02-09 RX ADMIN — IBUPROFEN 600 MG: 600 TABLET, FILM COATED ORAL at 06:02

## 2025-02-10 NOTE — ED PROVIDER NOTES
Encounter Date: 2/9/2025  History from patient, and family     History     Chief Complaint   Patient presents with    Fever    Tremors     Reports fever and tremors that stared 30 minutes ago. Reports 3 episodes of vomiting. Also c/o generalized body aches.     HPI    Bryan Collette is 64 y.o. 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 arrives in ER with c/o Fever and Tremors (Reports fever and tremors that stared 30 minutes ago. Reports 3 episodes of vomiting. 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     HLD (hyperlipidemia)     Hypertension     Perforation of left tympanic membrane     RLS (restless legs syndrome)     Unspecified sensorineural hearing loss      Past Surgical History:   Procedure Laterality Date    APPLICATION OF CARTILAGE GRAFT Right 12/22/2022    Procedure: APPLICATION, CARTILAGE GRAFT;  Surgeon: Jesu Bautista Jr., MD;  Location: [facility] OR;  Service: ENT;  Laterality: Right;    ARTHROSCOPY OF KNEE Bilateral     COLONOSCOPY      SHOULDER SURGERY Left     TRIGGER FINGER RELEASE Bilateral     x2 right hand, x2 left hand    TYMPANOPLASTY Right 12/22/2022    Procedure: RIGHT POST AURICULAR CARTILAGE TYMPANOPLASTY- NDM;  Surgeon: Jesu Bautista Jr., MD;  Location: [facility] OR;  Service: ENT;  Laterality: Right;     Family History   Family history unknown: Yes     Social History     Tobacco Use    Smoking status: Never    Smokeless tobacco: Never   Substance Use Topics    Alcohol use: Yes     Alcohol/week: 1.0 standard drink of alcohol     Types: 1 Shots of liquor per week     Comment: Very occasionally    Drug use: Never     Review of Systems   Constitutional:  Positive  for chills and fever.   HENT:  Negative for trouble swallowing and voice change.    Eyes:  Negative for visual disturbance.   Respiratory:  Negative for cough and shortness of breath.    Cardiovascular:  Negative for chest pain.   Gastrointestinal:  Negative for abdominal pain, diarrhea and vomiting.   Genitourinary:  Negative for dysuria and hematuria.   Musculoskeletal:  Negative for back pain and gait problem.   Skin:  Negative for color change and rash.   Neurological:  Negative for headaches.   Psychiatric/Behavioral:  Negative for behavioral problems and sleep disturbance.    All other systems reviewed and are negative.      Physical Exam     Initial Vitals [02/09/25 1806]   BP Pulse Resp Temp SpO2   139/81 92 18 100.1 °F (37.8 °C) 99 %      MAP       --         Physical Exam    Nursing note and vitals reviewed.  Constitutional: He appears well-developed. No distress.   HENT:   Head: Atraumatic.   Eyes: EOM are normal.   Cardiovascular:  Normal rate and normal heart sounds.           Pulmonary/Chest: Breath sounds normal. No respiratory distress. He has no wheezes.   Abdominal: Abdomen is soft. Bowel sounds are normal.   Musculoskeletal:         General: No edema. Normal range of motion.      Cervical back: No bony tenderness.     Neurological: He is alert. GCS score is 15. GCS eye subscore is 4. GCS verbal subscore is 5. GCS motor subscore is 6.   Speech Normal   Skin: Skin is warm and dry.   Psychiatric: He has a normal mood and affect.   Pleasant         ED Course   Procedures  Orders Placed This Encounter    Blood culture #2 **CANNOT BE ORDERED STAT**    Blood culture #1 **CANNOT BE ORDERED STAT**    X-Ray Chest 1 View    CT Head Without Contrast    COVID/RSV/FLU A&B PCR    CBC auto differential    Comprehensive metabolic panel    Urinalysis, Reflex to Urine Culture    Lactic acid, plasma    CBC with Differential    Insert Saline lock IV    ibuprofen tablet 600 mg    lactated ringers bolus 1,000 mL     acetaminophen tablet 1,000 mg    ibuprofen (ADVIL,MOTRIN) 600 MG tablet    oseltamivir (TAMIFLU) 75 MG capsule       Labs Reviewed   COMPREHENSIVE METABOLIC PANEL - Abnormal       Result Value    Sodium 139      Potassium 5.0      Chloride 105      CO2 26      Glucose 80 (*)     Blood Urea Nitrogen 31.0 (*)     Creatinine 1.78 (*)     Calcium 8.8      Protein Total 6.9      Albumin 4.0      Globulin 2.9      Albumin/Globulin Ratio 1.4      Bilirubin Total 1.1      ALP 72      ALT 20      AST 21      eGFR 42      Anion Gap 8.0      BUN/Creatinine Ratio 17     CBC WITH DIFFERENTIAL - Abnormal    WBC 4.62      RBC 4.39 (*)     Hgb 14.1      Hct 40.7 (*)     MCV 92.7      MCH 32.1 (*)     MCHC 34.6      RDW 11.9      Platelet 157      MPV 8.9      Neut % 89.2      Lymph % 7.8      Mono % 1.5      Eos % 0.9      Basophil % 0.2      Imm Grans % 0.4      Neut # 4.12      Lymph # 0.36 (*)     Mono # 0.07 (*)     Eos # 0.04      Baso # 0.01      Imm Gran # 0.02      NRBC% 0.0     COVID/RSV/FLU A&B PCR - Normal    Influenza A PCR Not Detected      Influenza B PCR Not Detected      Respiratory Syncytial Virus PCR Not Detected      SARS-CoV-2 PCR Not Detected      Narrative:     The Xpert Xpress SARS-CoV-2/FLU/RSV plus is a rapid, multiplexed real-time PCR test intended for the simultaneous qualitative detection and differentiation of SARS-CoV-2, Influenza A, Influenza B, and respiratory syncytial virus (RSV) viral RNA in either nasopharyngeal swab or nasal swab specimens.         URINALYSIS, REFLEX TO URINE CULTURE - Normal    Color, UA Yellow      Appearance, UA Clear      Specific Gravity, UA 1.020      pH, UA 6.5      Protein, UA Negative      Glucose, UA Negative      Ketones, UA Negative      Blood, UA Negative      Bilirubin, UA Negative      Urobilinogen, UA 0.2      Nitrites, UA Negative      Leukocyte Esterase, UA Negative     LACTIC ACID, PLASMA - Normal    Lactic Acid Level 1.5     BLOOD CULTURE [facility]   BLOOD CULTURE  G   CBC W/ AUTO DIFFERENTIAL    Narrative:     The following orders were created for panel order CBC auto differential.  Procedure                               Abnormality         Status                     ---------                               -----------         ------                     CBC with Differential[[id number]]       Abnormal            Final result                 Please view results for these tests on the individual orders.          Imaging Results              CT Head Without Contrast (Final result)  Result time 02/09/25 19:47:28      Final result by Amilcar Antunez MD (02/09/25 19:47:28)                   Impression:      No acute abnormalities are seen      Electronically signed by: Amilcar Antunez MD  Date:    02/09/2025  Time:    19:47               Narrative:    EXAMINATION:  CT HEAD WITHOUT CONTRAST    CLINICAL HISTORY:  Headache, new or worsening (Age >= 50y);    TECHNIQUE:  Low dose axial images were obtained through the head.  Coronal and sagittal reformations were also performed. Contrast was not administered.    COMPARISON:  None.    FINDINGS:  Ventricles of normal size and shape there is no shift of the midline noted.  There are no extra-axial fluid collections are areas consistent with hemorrhage noted.  No masses is seen no acute infarcts are noted.  The calvarium appears intact.  There is a retention cyst in the base of the left maxillary sinus.                                       X-Ray Chest 1 View (Final result)  Result time 02/09/25 18:54:15      Final result by Amilcar Antunez MD (02/09/25 18:54:15)                   Impression:      No acute abnormality.      Electronically signed by: Amilcar Antunez MD  Date:    02/09/2025  Time:    18:54               Narrative:    EXAMINATION:  XR CHEST 1 VIEW    CLINICAL HISTORY:  Fever, unspecified    TECHNIQUE:  Single frontal view of the chest was performed.    COMPARISON:  None    FINDINGS:  The lungs are clear, with normal appearance  of pulmonary vasculature and no pleural effusion or pneumothorax.    The cardiac silhouette is normal in size. The hilar and mediastinal contours are unremarkable.    Bones are intact.                                       Medications   ibuprofen tablet 600 mg (600 mg Oral Given 2/9/25 1838)   lactated ringers bolus 1,000 mL (0 mLs Intravenous Stopped 2/9/25 2026)   acetaminophen tablet 1,000 mg (1,000 mg Oral Given 2/9/25 2037)     Medical Decision Making    Bryan Collette is 64 y.o. 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 arrives in ER with c/o Fever and Tremors (Reports fever and tremors that stared 30 minutes ago. Reports 3 episodes of vomiting. Also c/o generalized body aches.)    According to patient he was perfectly all right was working outside on a generator and started having chills when he came in he was shaking and they noticed that he has fever.  So they rushed him to the emergency room.  He threw up 3 times.  Denies any other complaints.    He only takes pain medicines for his the joint pains.    Amount and/or Complexity of Data Reviewed  Labs: ordered.  Radiology: ordered.    Risk  OTC drugs.  Prescription drug management.               ED Course as of 02/09/25 2159   Sun Feb 09, 2025 1934 Temp(!): 103.2 °F (39.6 °C) [GQ]   1934 Pulse: 83 [GQ]   1934 SpO2: 98 %  Patient's vital signs remained normal except for temperature which just going to 103.2, we had given him ibuprofen on arrival, I will let it work, his complaining of frontal headache, and according to family he is talking out of his head.  I will go ahead and get a CT head done on him since rest of the workup is essentially negative.  He does have some dehydration I will give him 1 L of Ringer's lactate bolus. [GQ]   2033 Patient is feeling much better, he got 1  L of Ringer's lactate bolus, he is comfortably sitting in the bed, O2 sat is almost 100%, he is still has fever of 103.  I will give him a dose of Tylenol [GQ]   2143 Temp(!): 100.7 °F (38.2 °C) [GQ]   2143 BP: 118/62 [GQ]   2143 Pulse: 72 [GQ]   2143 SpO2: 97 %  Patient's workup in the emergency room does not reveal any other major abnormality, his fever has come down, I am going to write a prescription for Tamiflu, I feel that he is coming down with influenza.  He just started having symptoms 30 minutes before he came to the emergency room.  I will let him go home with instruction to come back to the emergency room in case he develops any other symptoms.  Otherwise he should see his family doctor for follow up. [GQ]   2158 Patient's son who is an ER physician in [place] called and I went over all the labs with him, and informed him about my findings, and my plan, in his okay with that.  I am going to discharge patient home I have sent a prescription of Tamiflu and ibuprofen to the pharmacy for them.  They will fill it if he develops any other symptoms.  They live in another town and I have advised him that he can always go to his family doctor or the ER there for re-evaluation in case he develops any other symptoms. [GQ]      ED Course User Index  [GQ] Camila Conklin MD                           Clinical Impression:  Final diagnoses:  [R50.9] Fever          ED Disposition Condition    Discharge Stable          ED Prescriptions       Medication Sig Dispense Start Date End Date Auth. Provider    oseltamivir (TAMIFLU) 75 MG capsule  (Status: Discontinued) Take 1 capsule (75 mg total) by mouth 2 (two) times daily. for 5 days 10 capsule 2/9/2025 2/9/2025 Camila Conklin MD    ibuprofen (ADVIL,MOTRIN) 600 MG tablet  (Status: Discontinued) Take 1 tablet (600 mg total) by mouth every 6 (six) hours as needed for Pain. 20 tablet 2/9/2025 2/9/2025 Camila Conklin MD    ibuprofen (ADVIL,MOTRIN) 600 MG tablet Take 1  tablet (600 mg total) by mouth every 6 (six) hours as needed for Pain. 20 tablet 2/9/2025 -- Camila Conklin MD    oseltamivir (TAMIFLU) 75 MG capsule Take 1 capsule (75 mg total) by mouth 2 (two) times daily. for 5 days 10 capsule 2/9/2025 2/14/2025 Camila Conklin MD          Follow-up Information       Follow up With Specialties Details Why Contact Info    Jero Dominguez Jr., MD Family Medicine In 1 day  [address]  [facility] [address]  [phone]               Camila Conklin MD  02/09/25 0470       Camila Conklin MD  02/09/25 [id number]

## 2025-02-15 LAB
BACTERIA BLD CULT: NORMAL
BACTERIA BLD CULT: NORMAL

## 2025-05-20 ENCOUNTER — LAB VISIT (OUTPATIENT)
Dept: LAB | Facility: HOSPITAL | Age: 65
End: 2025-05-20
Attending: FAMILY MEDICINE
Payer: MEDICARE

## 2025-05-20 DIAGNOSIS — Z12.5 SPECIAL SCREENING FOR MALIGNANT NEOPLASM OF PROSTATE: ICD-10-CM

## 2025-05-20 DIAGNOSIS — Z00.00 ROUTINE GENERAL MEDICAL EXAMINATION AT A HEALTH CARE FACILITY: ICD-10-CM

## 2025-05-20 DIAGNOSIS — I10 ESSENTIAL HYPERTENSION, MALIGNANT: ICD-10-CM

## 2025-05-20 DIAGNOSIS — E78.5 HYPERLIPIDEMIA, UNSPECIFIED HYPERLIPIDEMIA TYPE: Primary | ICD-10-CM

## 2025-05-20 LAB
ALBUMIN SERPL-MCNC: 3.6 G/DL (ref 3.4–4.8)
ALP SERPL-CCNC: 77 UNIT/L (ref 40–150)
ALT SERPL-CCNC: 17 UNIT/L (ref 0–55)
ANION GAP SERPL CALC-SCNC: 4 MEQ/L
AST SERPL-CCNC: 20 UNIT/L (ref 11–45)
BASOPHILS # BLD AUTO: 0.02 X10(3)/MCL
BASOPHILS NFR BLD AUTO: 0.4 %
BILIRUB DIRECT SERPL-MCNC: 0.3 MG/DL (ref 0–?)
BILIRUB INDIRECT SERPL-MCNC: 0.8 MG/DL (ref 0–0.8)
BILIRUB SERPL-MCNC: 1.1 MG/DL
BUN SERPL-MCNC: 29.5 MG/DL (ref 8.4–25.7)
CALCIUM SERPL-MCNC: 9.1 MG/DL (ref 8.8–10)
CHLORIDE SERPL-SCNC: 105 MMOL/L (ref 98–107)
CHOLEST SERPL-MCNC: 188 MG/DL
CHOLEST/HDLC SERPL: 4 {RATIO} (ref 0–5)
CO2 SERPL-SCNC: 29 MMOL/L (ref 23–31)
CREAT SERPL-MCNC: 1.38 MG/DL (ref 0.72–1.25)
CREAT/UREA NIT SERPL: 21
EOSINOPHIL # BLD AUTO: 0.23 X10(3)/MCL (ref 0–0.9)
EOSINOPHIL NFR BLD AUTO: 4.7 %
ERYTHROCYTE [DISTWIDTH] IN BLOOD BY AUTOMATED COUNT: 11.5 % (ref 11.5–17)
GFR SERPLBLD CREATININE-BSD FMLA CKD-EPI: 56 ML/MIN/1.73/M2
GLUCOSE SERPL-MCNC: 88 MG/DL (ref 82–115)
HCT VFR BLD AUTO: 43.6 % (ref 42–52)
HDLC SERPL-MCNC: 48 MG/DL (ref 35–60)
HGB BLD-MCNC: 14.8 G/DL (ref 14–18)
IMM GRANULOCYTES # BLD AUTO: 0.01 X10(3)/MCL (ref 0–0.04)
IMM GRANULOCYTES NFR BLD AUTO: 0.2 %
LDLC SERPL CALC-MCNC: 119 MG/DL (ref 50–140)
LYMPHOCYTES # BLD AUTO: 1.68 X10(3)/MCL (ref 0.6–4.6)
LYMPHOCYTES NFR BLD AUTO: 34.6 %
MCH RBC QN AUTO: 32.5 PG (ref 27–31)
MCHC RBC AUTO-ENTMCNC: 33.9 G/DL (ref 33–36)
MCV RBC AUTO: 95.8 FL (ref 80–94)
MONOCYTES # BLD AUTO: 0.39 X10(3)/MCL (ref 0.1–1.3)
MONOCYTES NFR BLD AUTO: 8 %
NEUTROPHILS # BLD AUTO: 2.53 X10(3)/MCL (ref 2.1–9.2)
NEUTROPHILS NFR BLD AUTO: 52.1 %
PLATELET # BLD AUTO: 190 X10(3)/MCL (ref 130–400)
PMV BLD AUTO: 8.8 FL (ref 7.4–10.4)
POTASSIUM SERPL-SCNC: 4.3 MMOL/L (ref 3.5–5.1)
PROT SERPL-MCNC: 6.7 GM/DL (ref 5.8–7.6)
PSA SERPL-MCNC: 0.61 NG/ML
RBC # BLD AUTO: 4.55 X10(6)/MCL (ref 4.7–6.1)
SODIUM SERPL-SCNC: 138 MMOL/L (ref 136–145)
T4 FREE SERPL-MCNC: 1 NG/DL (ref 0.7–1.48)
TRIGL SERPL-MCNC: 107 MG/DL (ref 34–140)
TSH SERPL-ACNC: 3.05 UIU/ML (ref 0.35–4.94)
VLDLC SERPL CALC-MCNC: 21 MG/DL
WBC # BLD AUTO: 4.86 X10(3)/MCL (ref 4.5–11.5)

## 2025-05-20 PROCEDURE — 36415 COLL VENOUS BLD VENIPUNCTURE: CPT

## 2025-05-20 PROCEDURE — 84443 ASSAY THYROID STIM HORMONE: CPT

## 2025-05-20 PROCEDURE — 84439 ASSAY OF FREE THYROXINE: CPT

## 2025-05-20 PROCEDURE — 84153 ASSAY OF PSA TOTAL: CPT

## 2025-05-20 PROCEDURE — 80076 HEPATIC FUNCTION PANEL: CPT

## 2025-05-20 PROCEDURE — 85025 COMPLETE CBC W/AUTO DIFF WBC: CPT

## 2025-05-20 PROCEDURE — 80061 LIPID PANEL: CPT

## 2025-05-20 PROCEDURE — 80048 BASIC METABOLIC PNL TOTAL CA: CPT
